# Patient Record
Sex: MALE | Race: WHITE
[De-identification: names, ages, dates, MRNs, and addresses within clinical notes are randomized per-mention and may not be internally consistent; named-entity substitution may affect disease eponyms.]

---

## 2017-06-04 ENCOUNTER — HOSPITAL ENCOUNTER (EMERGENCY)
Dept: HOSPITAL 25 - ED | Age: 47
Discharge: HOME | End: 2017-06-04
Payer: COMMERCIAL

## 2017-06-04 VITALS — SYSTOLIC BLOOD PRESSURE: 109 MMHG | DIASTOLIC BLOOD PRESSURE: 74 MMHG

## 2017-06-04 DIAGNOSIS — I82.812: Primary | ICD-10-CM

## 2017-06-04 DIAGNOSIS — M79.605: ICD-10-CM

## 2017-06-04 PROCEDURE — 99282 EMERGENCY DEPT VISIT SF MDM: CPT

## 2017-06-04 NOTE — ED
Lower Extremity





- HPI Summary


HPI Summary: 





47 male sent over from [facility] (formerly [facility]) and presents with complaint of left calf pain x 1 

week.  Denies previous DVT but notes to left leg swelling x 1 year that is on 

and off however with no pain. An US was performed at that time and showed no 

evidence of DVT. Denies new leg swelling at this time but states he has pain in 

the medial side of the left calf. States he can feel a deformity. Pain kept him 

up last night. Took ibuprofen at 2am this morning, for the first time and 

symptoms improved significantly. Pain is not better or worse with standing. 

Patient is a non-smoker and is sedentary in his job. He drives trucks often and 

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 Denies chest pain or pressure or 

SOB. Denies recent travel or immunocompromised state. He is otherwise healthy 

and takes no medications, no PMHx. No known recent trauma or injury.





- History of Current Complaint


Chief Complaint: EDExtremityLower


Stated Complaint: LT LEG PAIN


Time Seen by Provider: 06/04/17 10:49


Hx Obtained From: Patient


Mechanism Of Injury: Unknown


Onset of Pain: Days - 6 days ago


Onset/Duration: Days


Severity Initially: Moderate


Severity Currently: Mild


Pain Intensity: 4


Pain Scale Used: 0-10 Numeric


Timing: Constant


Location: Is Discrete @ - left lower medial calf


Character Of Pain: Aching


Associated Signs And Symptoms: Positive: Swelling, Other - warmth to area of 

medial calf where pain is located


Aggravating Factor(s): Nothing


Alleviating Factor(s): OTC Meds - ibuprofen


Able to Bear Weight: Yes





- Allergies/Home Medications


Allergies/Adverse Reactions: 


 Allergies











Allergy/AdvReac Type Severity Reaction Status Date / Time


 


No Known Allergies Allergy   Verified 06/04/17 09:51














PMH/Surg Hx/FS Hx/Imm Hx


Endocrine/Hematology History: 


   Denies: Hx Diabetes


Cardiovascular History: 


   Denies: Hx Hypertension


Respiratory History: 


   Denies: Hx Asthma





- Surgical History


Surgery Procedure, Year, and Place: none





- Immunization History


Immunizations Up to Date: Yes


Infectious Disease History: No


Infectious Disease History: 


   Denies: Traveled Outside the [place] in Last 30 Days





- Family History


Known Family History: Positive: None - mother possible DVT





- Social History


Alcohol Use: None


Substance Use Type: Reports: None


Smoking Status (MU): Never Smoked Tobacco


Have You Smoked in the Last Year: No





Review of Systems


Constitutional: Negative


Cardiovascular: Negative


Respiratory: Negative


Gastrointestinal: Negative


Positive: Arthralgia, Myalgia, Edema - left lower leg


Skin: Negative


Neurological: Negative


All Other Systems Reviewed And Are Negative: Yes





Physical Exam


Triage Information Reviewed: Yes


Vital Signs On Initial Exam: 


 Initial Vitals











Temp Pulse Resp BP Pulse Ox


 


 96.5 F   66   20   109/74   100 


 


 06/04/17 10:43  06/04/17 10:43  06/04/17 10:43  06/04/17 10:43  06/04/17 10:43











Vital Signs Reviewed: Yes


Appearance: Positive: Well-Appearing, No Pain Distress, Well-Nourished


Skin: Positive: Warm, Skin Color Reflects Adequate Perfusion, Dry, Other - hot 

to touch of left medial lower calf no erythema or ecchymosis noted. no obvious 

deformity. edema of left lower calf 40cm compared to right calf 38cm. no 

pitting edema. tender to palpation of medial lower calf. no open wounds or 

signs of infection


Head/Face: Positive: Normal Head/Face Inspection


Eyes: Positive: Normal


ENT: Positive: Normal ENT inspection, Hearing grossly normal


Neck: Positive: Supple, Nontender, No Lymphadenopathy


Respiratory/Lung Sounds: Positive: Clear to Auscultation, Breath Sounds 

Present.  Negative: Decreased Breath Sounds, Rales, Rhonchi, Stridor, Tracheal 

Deviation, Wheezes


Cardiovascular: Positive: Normal, RRR, Pulses are Symmetrical in both Upper and 

Lower Extremities - 2+ pedal b/l, Leg Edema Left.  Negative: Murmur, Rub, Leg 

Edema Right


Abdomen Description: Positive: Nontender, Soft


Bowel Sounds: Positive: Present


Musculoskeletal: Positive: Normal, Strength/ROM Intact, Pain @ - left medial 

calf on palpation, Edema Left - 40cm on left calf compared to 38cm on right 

calf. no pitting edema, only edema of medial calf, Other - no crepitus, step 

off or bony tenderness.  Negative: Limited @, Interruption @


Neurological: Positive: Normal, Sensory/Motor Intact - sensation intact and 

normal, Alert, Oriented to Person Place, Time, CN Intact II-III, Reflexes Intact

, NV Bundle Intact Distally, Normal Gait


Psychiatric: Positive: Normal


AVPU Assessment: Alert





Diagnostics





- Vital Signs


 Vital Signs











  Temp Pulse Resp BP Pulse Ox


 


 06/04/17 10:46  97.0 F  75  20  109/74  100


 


 06/04/17 10:43  96.5 F  66  20  109/74  100














- Laboratory


Lab Statement: Any lab studies that have been ordered have been reviewed, and 

results considered in the medical decision making process.





- Ultrasound


  ** No standard instances


Ultrasound Interpretation: Positive (See Comments) - Bilaterally the common 

femoral veins appear patent and compressible. Left proximal greater saphenous 

vein, proximal deep femoral vein, femoral vein, popliteal vein, posterior 

tibial veins and peroneal veins appear patent and compressible. There is 

thrombosis of the left greater saphenous vein in the calf likely representing 

superficial venous thrombosis


Ultrasound Interpretation Completed By: Radiologist





Lower Extremity Course/Dx





- Course


Course Of Treatment: patient took ibuprofen before arrival which gave him 

significant relief. U/S did show superficial thrombophlebitis of great 

saphenous vein. After research and speaking with Dr Fraser and patient about 

treatment for this condition it was decided to continue NSAIDs at this time as 

patient has limited risk factors for DVT. He is otherwise a healthy renata. 

Chronic lower leg edema requires a further work up. Will being NSAIDs treatment 

and follow up with his PCP to decide if antiocoagulants should be started and 

for repeat U/S and further work up for the cause of his condition. Patient was 

educated on worsening signs and symptoms extensively and to return if any occur 

or symptoms persist. Refrain from being sedentary, stretch and ambulate, 

elevate and cool compresses. Follow up PCP.





- Diagnoses


Differential Diagnosis/HQI/PQRI: Positive: Compartment Syndrome, Contusion, DVT

, Sprain, Strain, Tendonitis, Tenosynovitis, Other


Provider Diagnoses: 


 Acute superficial venous thrombosis of left lower extremity








- Physician Notifications


Discussed Care Of Patient With: Dr Fraser about treatment options





Discharge





- Discharge Plan


Condition: Stable


Disposition: HOME


Patient Education Materials:  Superficial Thrombophlebitis (ED)


Referrals: 


Barbara Espinoza [Primary Care Provider] - 


Additional Instructions: 


Take ibuprofen 400-600mg every 4-6 hours with meals or Aspirin for the next 7 

days and until seen by primary care provider.


Elevate your leg, avoid being sedentary and apply warm or cool compresses. 


If symptoms worsen or do not improve please return to ED immediately, as your 

condition can worsen.


Make an appointment for follow up and further work up with your family doctor 

within the next 3-5 days.


I included more information for your condition below:








What is superficial vein phlebitis/thrombosis?  "Superficial vein phlebitis" 

and "superficial vein thrombosis" are medical terms for problems with the veins 

that are close to the surface of the skin (called the superficial veins):


Superficial vein phlebitis is when the veins get inflamed.


Superficial vein thrombosis is when blood clots form in the veins.


If both problems happen, it is called "superficial vein thrombophlebitis." 

Superficial vein phlebitis/thrombosis is related to another vein problem called 

"deep vein thrombosis" or "DVT."


DVT is when a vein located deep between the muscles gets inflamed or becomes 

clotted. DVT can be very dangerous because clots within a deep vein can break 

off and travel to the lungs, causing something called a "pulmonary embolism."


Again, superficial vein phlebitis/thrombosis affects veins near the surface of 

the skin. DVT affects veins deep in the muscle.


Is superficial vein phlebitis/thrombosis dangerous?  Superficial vein 

phlebitis is not usually dangerous. But with superficial vein thrombosis, clots 

in a superficial vein can extend into a deep vein causing DVT, or break off, 

causing pulmonary embolism. For these reasons, superficial vein thrombosis is 

taken very seriously, especially when it affects the thigh or upper arm, where 

superficial and deep veins meet.


How does superficial vein phlebitis/thrombosis relate to other vein problems?  

People who get superficial vein phlebitis/thrombosis often also have a type of 

vein disease called "venous insufficiency."


Venous insufficiency can occur with or without varicose veins (twisted, swollen 

veins), and most often affects the legs. When the veins are healthy and working 

normally, they carry blood in only 1 direction, from the arms and legs back to 

the heart. Veins have valves inside them to keep blood moving toward the heart. 

The valves open to let blood flow to the heart, and close to keep blood from 

flowing backwards. When the valves are damaged or do not work well, blood flows 

backward and collects in the veins. This is called venous insufficiency.


People without venous insufficiency can also get superficial vein phlebitis/

thrombosis. This usually happens after having an intravenous catheter, which is 

a tube that goes into a vein to give medicines. But even people who had nothing 

put into a vein can get superficial vein phlebitis/thrombosis. For instance, it 

can happen to people with blood clotting problems or cancer.


What are the symptoms of superficial vein phlebitis/thrombosis?  The symptoms 

include:


Pain, tenderness, or redness along the length of a vein


Hardening of the vein


Fever


Fluid draining from the area where a catheter was put in


Swelling of the affected arm or leg


Should I see a doctor or nurse?  Yes, if you have symptoms of superficial vein 

phlebitis/thrombosis, see your doctor or nurse. See him or her right away if 

the affected arm or leg is swollen, or if the affected vein is in the thigh or 

upper arm.


Call for an ambulance (in the [place] and [place], dial 9-1-1) if you get symptoms of 

a blood clot in the lungs, such as:


Panting or trouble breathing


Sharp, knife-like chest pain when you breathe in


Coughing or coughing up blood


A rapid heartbeat


Will I need tests?  Maybe. Your doctor or nurse might be able to tell what is 

happening by doing an exam and learning about your symptoms. He or she might 

also do a test called an ultrasound. An ultrasound will show if any of the 

veins are blocked, especially the deep veins. It can also check how well the 

valves in the veins work. In some cases, your doctor might order blood tests.


How is superficial vein phlebitis/thrombosis treated?  The treatment for 

superficial vein phlebitis/thrombosis focuses on easing the symptoms. To do this

, doctors recommend that you:


Use heating or cooling pads on the affected area


Raise the arm or leg, propping it up on pillows or a chair when resting


Take a medicine called an NSAID  Examples include ibuprofen (sample brand names

: Advil, Motrin) and naproxen (sample brand names: Aleve, Naprosyn)


If your superficial vein phlebitis/thrombosis is near where you have (or had) 

an intravenous catheter, your doctor will check for infection. If you do have 

an infection, you might need antibiotics.


If superficial vein phlebitis/thrombosis is in your leg, your doctor or nurse 

might also suggest you wear compression stockings. These are special socks that 

fit tightly over the ankle and leg. If your doctor or nurse recommends them, he 

or she will tell you which type to wear and how to put them on.


Some people do not need treatment beyond that described above. In some cases 

where superficial vein thrombosis is near the deep veins, though, your doctors 

will prescribe a medicine to prevent more clots from forming.


Can superficial vein phlebitis/thrombosis be prevented?  You can reduce your 

chances of getting superficial vein phlebitis/thrombosis in the leg veins by 

staying active and not sitting too long without moving.

## 2017-06-04 NOTE — RAD
Indication: Left leg edema.



Duplex Doppler sonography of the deep venous system of the left lower extremity deep

venous system was performed.



Bilaterally the common femoral veins appear patent and compressible. Left proximal greater

saphenous vein, proximal deep femoral vein, femoral vein, popliteal vein, posterior tibial

veins and peroneal veins appear patent and compressible. There is thrombosis of the left

greater saphenous vein in the calf likely representing superficial venous thrombosis   



IMPRESSION: NO EVIDENCE OF DEEP VENOUS THROMBOSIS IS IDENTIFIED. THROMBOSIS OF THE GREATER

SAPHENOUS VEIN IN THE CALF. THIS IS CONSISTENT WITH SUPERFICIAL VENOUS THROMBOSIS.

## 2018-07-17 ENCOUNTER — HOSPITAL ENCOUNTER (EMERGENCY)
Dept: HOSPITAL 25 - UCEAST | Age: 48
Discharge: HOME | End: 2018-07-17
Payer: COMMERCIAL

## 2018-07-17 VITALS — SYSTOLIC BLOOD PRESSURE: 136 MMHG | DIASTOLIC BLOOD PRESSURE: 89 MMHG

## 2018-07-17 DIAGNOSIS — Z83.3: ICD-10-CM

## 2018-07-17 DIAGNOSIS — Z88.0: ICD-10-CM

## 2018-07-17 DIAGNOSIS — L74.0: ICD-10-CM

## 2018-07-17 DIAGNOSIS — L03.311: Primary | ICD-10-CM

## 2018-07-17 PROCEDURE — 99212 OFFICE O/P EST SF 10 MIN: CPT

## 2018-07-17 PROCEDURE — G0463 HOSPITAL OUTPT CLINIC VISIT: HCPCS

## 2018-07-17 NOTE — UC
Skin Complaint HPI





- HPI Summary


HPI Summary: 


This is annamaria Panda documenting for attending Cynthia Stoner MD.





This patient is a 48 year old M presenting to [facility] with a chief complaint of 

red rash on abdomen since 7 days ago. The patient reports that the rash is 

itchy and has been spreading. 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 

and thinks he might have gotten into something.  Pt has a patch on right hip 

which is bigger and has been itching x 5 days. Pt states itching worse when he 

gets warm. The patient rates the pain 0/10 in severity. Symptoms aggravated by 

nothing. Symptoms alleviated by nothing.  Patient denies fever, nausea or 

chills. The patient denies taking any medications.





- History of Current Complaint


Chief Complaint: UCRash


Time Seen by Provider: 07/17/18 18:03


Stated Complaint: RASH


Hx Obtained From: Patient


Onset/Duration: Gradual Onset, Lasting Days - 7 days, Still Present, Worse 

Since - a few days ago


Skin Exposure Onset/Duration: Days Ago


Timing: Constant


Current Severity: None


Pain Intensity: 0


Pain Scale Used: 0-10 Numeric


Location: Other - abdomen


Character: Pruritus, Redness, Raised


Aggravating Factor(s): Nothing


Alleviating Factor(s): Nothing


Associated Signs & Symptoms: Positive: Rash.  Negative: Nausea, Fever, Chills


Related History: Possible Reaction to: Environmental Exposure





- Allergy/Home Medications


Allergies/Adverse Reactions: 


 Allergies











Allergy/AdvReac Type Severity Reaction Status Date / Time


 


amoxicillin Allergy Intermediate nausea Verified 07/17/18 17:27





   vomitting  














Review of Systems


Constitutional: Negative


Skin: Rash - on abdomen


Respiratory: Negative - negative shortness of breath


Gastrointestinal: Negative - negative nausea


All Other Systems Reviewed And Are Negative: Yes





PMH/Surg Hx/FS Hx/Imm Hx


Previously Healthy: Yes





- Surgical History


Surgical History: None


Surgery Procedure, Year, and Place: none





- Family History


Known Family History: Positive: Diabetes - grandmother, Other - mother possible 

DVT





- Social History


Occupation: Employed Full-time - mows grass at roadside


Alcohol Use: None


Substance Use Type: None


Smoking Status (MU): Never Smoked Tobacco


Have You Smoked in the Last Year: No





Physical Exam





- Summary


Physical Exam Summary: 





Vital Signs Reviewed: Yes


A+Ox3, no distress


Eyes: Conjunctiva Clear


ENT: Hearing grossly normal  


neck: supple


Respiratory: Positive: No respiratory distress, No accessory muscle use 


Cardiovascular: skin color reflect adequate perfusion


Musculoskeletal Exam: CURIEL x 4 without difficulty 


Neurological: Positive: Alert,  ambulatory without difficulty


Psychological: Positive: Normal Response To Family


Skin: Positive:  no ecchymosis Pt with multiple raised, mild erythema inflammed 

follicles across abdomen, under panus (appears c/w prickly heat rash)   On 

right lower abd pt with 3x2cm patch of erythema warm, raised, appears pruritic


Triage Information Reviewed: Yes


Vital Signs: 


 Initial Vital Signs











Temp  97.5 F   07/17/18 17:23


 


Pulse  74   07/17/18 17:23


 


Resp  18   07/17/18 17:23


 


BP  136/89   07/17/18 17:23


 


Pulse Ox  100   07/17/18 17:23














Course/Dx





- Course


Course Of Treatment: Pt with inflammed follicular appearing rash across abd, 

panus worse with heat, itching.  patch o nright lower abd appears early 

cellulitis.  Will Rx abx, pred.  cool soaks.  benadryl with precautions.  

return precautioins discussed





- Diagnoses


Provider Diagnoses: cellulitis.  heat rash





Discharge





- Sign-Out/Discharge


Documenting (check all that apply): Patient Departure





- Discharge Plan


Condition: Stable


Disposition: HOME


Prescriptions: 


DOXYcycline CAP(*) [DOXYcycline 100MG CAP(*)] 100 mg PO BID #14 cap


predniSONE TAB* [Deltasone TAB*] 50 mg PO DAILY #5 tab


Patient Education Materials:  Cellulitis (ED), Acute Rash (ED)


Referrals: 


Barbara Espinoza [Primary Care Provider] - 


Additional Instructions: 


The doctor that evaluated using suture rashes likely due to heat.  The red 

raised area by a right hip however is concerning for a local area of infection.

  Recommendations as follows:


- Take antibiotics as prescribed until gone


- Avoid getting overheated.  Take lukewarm showers, use cool cloth to help with 

itching.


- Okay to take Benadryl as needed for itching.  This medication causes 

sleepiness.  Do not drive, operate equipment, or drink alcohol while taking it


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


- Take prednisone as prescribed until gone


- Contact her doctor to schedule follow-up appointment.  Contact your doctor or 

return with any questions or concerns.





- Billing Disposition and Condition


Condition: STABLE


Disposition: Home